# Patient Record
Sex: FEMALE | Race: WHITE | NOT HISPANIC OR LATINO | Employment: FULL TIME | URBAN - METROPOLITAN AREA
[De-identification: names, ages, dates, MRNs, and addresses within clinical notes are randomized per-mention and may not be internally consistent; named-entity substitution may affect disease eponyms.]

---

## 2022-03-09 ENCOUNTER — HISTORICAL (OUTPATIENT)
Dept: LAB | Facility: HOSPITAL | Age: 57
End: 2022-03-09

## 2022-03-09 LAB
ABS NEUT (OLG): 3.88 (ref 2.1–9.2)
BASOPHILS # BLD AUTO: 0 10*3/UL (ref 0–0.2)
BASOPHILS NFR BLD AUTO: 0 %
EOSINOPHIL # BLD AUTO: 0.1 10*3/UL (ref 0–0.9)
EOSINOPHIL NFR BLD AUTO: 1 %
ERYTHROCYTE [DISTWIDTH] IN BLOOD BY AUTOMATED COUNT: 13.8 % (ref 11.5–14.5)
FLAG2 (OHS): 70
FLAG3 (OHS): 80
FLAGS (OHS): 80
HCT VFR BLD AUTO: 39.2 % (ref 35–46)
HGB BLD-MCNC: 12.9 G/DL (ref 12–16)
IMM GRANULOCYTES # BLD AUTO: 0.02 10*3/UL
IMM GRANULOCYTES NFR BLD AUTO: 0 %
IMM. NE 2 SUSPECT FLAG (OHS): 20
LOW EVENT # SUSPECT FLAG (OHS): 80
LYMPHOCYTES # BLD AUTO: 1.3 10*3/UL (ref 0.6–4.6)
LYMPHOCYTES NFR BLD AUTO: 23 %
MANUAL DIFF? (OHS): NO
MCH RBC QN AUTO: 29.7 PG (ref 26–34)
MCHC RBC AUTO-ENTMCNC: 32.9 G/DL (ref 31–37)
MCV RBC AUTO: 90.3 FL (ref 80–100)
MO BLASTS SUSPECT FLAG (OHS): 40
MONOCYTES # BLD AUTO: 0.5 10*3/UL (ref 0.1–1.3)
MONOCYTES NFR BLD AUTO: 8 %
NEUTROPHILS # BLD AUTO: 3.88 10*3/UL (ref 2.1–9.2)
NEUTROPHILS NFR BLD AUTO: 67 %
NRBC BLD AUTO-RTO: 0 % (ref 0–0.2)
PLATELET # BLD AUTO: 149 10*3/UL (ref 130–400)
PMV BLD AUTO: 10.4 FL (ref 7.4–10.4)
RBC # BLD AUTO: 4.34 10*6/UL (ref 4–5.2)
WBC # SPEC AUTO: 5.8 10*3/UL (ref 4.5–11)

## 2022-05-14 NOTE — PROGRESS NOTES
Past medical history: NIDDM.  Morbid obesity.  Tobacco abuse.    Procedure/surgical history:  .  Tubal ligation .  Left tympanic membranes perforation and tonsillectomy.  Cholecystectomy .  Carpal tunnel surgery .    Social history:  .  Lives in [place], [place].  Works as a .  Has 3 children living.  Her son  from mesothelioma at age 35 years.; he also suffered from nasopharyngeal cancer at age 11 years.  Has been smoking half a pack of cigarettes daily for 23 years.  No alcohol or illicit drug abuse.    Family history: Son diagnosed with cancer at age 35 years.  He experience nasopharyngeal cancer at age 12 years; was treated with chemotherapy and radiation therapy in [place].   from mesothelioma at age 36 years.  He was in  for 12 years.  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    Health maintenance:  Mammogram in  or  in up to cyst, apparently unremarkable.  Has never had screening colonoscopy done.      History of present illness:  57-year-old lady referred by Jeremy Saldivar NP, with thrombocytopenia.    Labs reviewed:    2022:  -CMP: Normal.    2022:  -CBC: Hemoglobin 12.5.  MCV 94.  Platelets 142K.    2022:   -CBC: WBC 5.8.  Hemoglobin 12.9.  MCV 90.3.  Platelets 149K.  Differential count normal.    -2022:  CBC completely normal; platelets 139 K    2022:  Presents for initial hematology consultation.  Pleasant lady.  In no acute discomfort.  -denies history of bleeding or undue bruising in any form.  No history of epistaxis, bleeding from the gums, easy bruising, prolonged bleeding, hematuria, hematemesis, hemoptysis, melena, or hematochezia.  Hot flashes, headaches, swelling in the feet, numbness and tingling in feet.  No unintentional weight loss.  No recurrent fevers or chills.  No lumps or  lymphadenopathy.      Interval history:      Review of systems:  All systems reviewed, and found to be negative except for the symptoms detailed above.      Physical examination:  VITAL SIGNS:  Reviewed.      GENERAL:  In no apparent distress.    HEAD:  No signs of head trauma.  EYES:  Pupils are equal.  Extraocular motions intact.    EARS:  Hearing grossly intact.  MOUTH:  Oropharynx is normal.   NECK:  No adenopathy, no JVD.     CHEST:  Chest with clear breath sounds bilaterally.  No wheezes, rales, or rhonchi.    CARDIAC:  Regular rate and rhythm.  S1 and S2, without murmurs, gallops, or rubs.  VASCULAR:  No Edema.  Peripheral pulses normal and equal in all extremities.  ABDOMEN:  Soft, without detectable tenderness.  No sign of distention.  No   rebound or guarding, and no masses palpated.   Bowel Sounds normal.  MUSCULOSKELETAL:  Good range of motion of all major joints. Extremities without clubbing, cyanosis or edema.    NEUROLOGIC EXAM:  Alert and oriented x 3.  No focal sensory or strength deficits.   Speech normal.  Follows commands.  PSYCHIATRIC:  Mood normal.  SKIN:  No rash or lesions.  05/18/2022:  In no acute discomfort.  Very pleasant.  Morbidly obese.      Assessment:  # thrombocytopenia:  -referred for evaluation of thrombocytopenia  -03/07/2022:  Platelets 142 K; rest of CBC unremarkable  -03/09/2022:  Platelets 149 K; rest of CBC unremarkable  -05/18/2022:  Platelets 139 K; rest of CBC also unremarkable    Patient does not have thrombocytopenia.      Plan:  Patient does not have thrombocytopenia.  Does not need any investigations pertaining to platelet count abnormality which was the reason for referral.    Educated in the bottoms of a few days screening mammography as well as screening colonoscopy.  She will follow-up with her PCP to discuss this.    Does not need hematology follow-up.    Above discussed with her.  Questions answered.  Shared results of relevant investigations.  She understands  and agrees this plan, and was profusely appreciative.

## 2022-05-17 PROBLEM — D69.6 THROMBOCYTOPENIA: Status: ACTIVE | Noted: 2022-05-17

## 2022-05-18 ENCOUNTER — DOCUMENTATION ONLY (OUTPATIENT)
Dept: HEMATOLOGY/ONCOLOGY | Facility: CLINIC | Age: 57
End: 2022-05-18

## 2022-05-18 ENCOUNTER — OFFICE VISIT (OUTPATIENT)
Dept: HEMATOLOGY/ONCOLOGY | Facility: CLINIC | Age: 57
End: 2022-05-18

## 2022-05-18 ENCOUNTER — TELEPHONE (OUTPATIENT)
Dept: HEMATOLOGY/ONCOLOGY | Facility: CLINIC | Age: 57
End: 2022-05-18

## 2022-05-18 VITALS
TEMPERATURE: 98 F | WEIGHT: 279 LBS | BODY MASS INDEX: 41.32 KG/M2 | RESPIRATION RATE: 20 BRPM | SYSTOLIC BLOOD PRESSURE: 121 MMHG | DIASTOLIC BLOOD PRESSURE: 73 MMHG | HEIGHT: 69 IN | OXYGEN SATURATION: 99 % | HEART RATE: 91 BPM

## 2022-05-18 DIAGNOSIS — D69.6 THROMBOCYTOPENIA: Primary | ICD-10-CM

## 2022-05-18 DIAGNOSIS — D69.6 THROMBOCYTOPENIA: ICD-10-CM

## 2022-05-18 DIAGNOSIS — Z72.0 TOBACCO USE: Primary | ICD-10-CM

## 2022-05-18 PROCEDURE — 80053 COMPREHEN METABOLIC PANEL: CPT | Performed by: INTERNAL MEDICINE

## 2022-05-18 PROCEDURE — 85025 COMPLETE CBC W/AUTO DIFF WBC: CPT | Performed by: INTERNAL MEDICINE

## 2022-05-18 PROCEDURE — 99214 OFFICE O/P EST MOD 30 MIN: CPT | Mod: PBBFAC | Performed by: INTERNAL MEDICINE

## 2022-05-18 PROCEDURE — 99203 PR OFFICE/OUTPT VISIT, NEW, LEVL III, 30-44 MIN: ICD-10-PCS | Mod: S$PBB,,, | Performed by: INTERNAL MEDICINE

## 2022-05-18 PROCEDURE — 99203 OFFICE O/P NEW LOW 30 MIN: CPT | Mod: S$PBB,,, | Performed by: INTERNAL MEDICINE

## 2022-05-18 RX ORDER — DICLOFENAC SODIUM 50 MG/1
TABLET, DELAYED RELEASE ORAL
COMMUNITY
End: 2023-02-28

## 2022-05-18 RX ORDER — METFORMIN HYDROCHLORIDE 500 MG/1
TABLET ORAL
COMMUNITY
Start: 2022-03-14

## 2022-05-18 RX ORDER — CYCLOBENZAPRINE HCL 5 MG
5 TABLET ORAL
COMMUNITY
Start: 2022-04-26

## 2022-05-18 RX ORDER — TIZANIDINE 4 MG/1
TABLET ORAL
COMMUNITY

## 2022-05-18 NOTE — PROGRESS NOTES
Addendum:  Error      Patient no showed 2022      Past medical history: NIDDM.  Morbid obesity.    Procedure/surgical history:  .  Tubal ligation .  Left tympanic membranes perforation and tonsillectomy.  Cholecystectomy .  Carpal tunnel surgery .    Social history:    Family history: Son diagnosed with cancer at age 35 years.    Health maintenance:    Menstrual and OB/GYN history:    History of present illness:  57-year-old lady referred by Jeremy Saldivar NP, with thrombocytopenia.    2022:  -CMP: Normal.    2022:  -CBC: Hemoglobin 12.5.  MCV 94.  Platelets 142K.    2022:   -CBC: WBC 5.8.  Hemoglobin 12.9.  MCV 90.3.  Platelets 149K.  Differential count normal.      Interval history:      Review of systems:  All systems reviewed, and found to be negative except for the symptoms detailed above.      Physical examination:  VITAL SIGNS:  Reviewed.      GENERAL:  In no apparent distress.    HEAD:  No signs of head trauma.  EYES:  Pupils are equal.  Extraocular motions intact.    EARS:  Hearing grossly intact.  MOUTH:  Oropharynx is normal.   NECK:  No adenopathy, no JVD.     CHEST:  Chest with clear breath sounds bilaterally.  No wheezes, rales, or rhonchi.    CARDIAC:  Regular rate and rhythm.  S1 and S2, without murmurs, gallops, or rubs.  VASCULAR:  No Edema.  Peripheral pulses normal and equal in all extremities.  ABDOMEN:  Soft, without detectable tenderness.  No sign of distention.  No   rebound or guarding, and no masses palpated.   Bowel Sounds normal.  MUSCULOSKELETAL:  Good range of motion of all major joints. Extremities without clubbing, cyanosis or edema.    NEUROLOGIC EXAM:  Alert and oriented x 3.  No focal sensory or strength deficits.   Speech normal.  Follows commands.  PSYCHIATRIC:  Mood normal.  SKIN:  No rash or lesions.      Assessment:        Plan:

## 2022-06-23 DIAGNOSIS — Z12.31 BREAST CANCER SCREENING BY MAMMOGRAM: Primary | ICD-10-CM

## 2022-08-31 ENCOUNTER — HOSPITAL ENCOUNTER (OUTPATIENT)
Dept: RADIOLOGY | Facility: HOSPITAL | Age: 57
Discharge: HOME | End: 2022-08-31
Attending: NURSE PRACTITIONER

## 2022-08-31 DIAGNOSIS — Z12.31 BREAST CANCER SCREENING BY MAMMOGRAM: ICD-10-CM

## 2022-08-31 PROCEDURE — 77063 BREAST TOMOSYNTHESIS BI: CPT | Mod: 26,,, | Performed by: RADIOLOGY

## 2022-08-31 PROCEDURE — 77067 MAMMO DIGITAL SCREENING BILAT WITH TOMO: ICD-10-PCS | Mod: 26,,, | Performed by: RADIOLOGY

## 2022-08-31 PROCEDURE — 77063 MAMMO DIGITAL SCREENING BILAT WITH TOMO: ICD-10-PCS | Mod: 26,,, | Performed by: RADIOLOGY

## 2022-08-31 PROCEDURE — 77067 SCR MAMMO BI INCL CAD: CPT | Mod: 26,,, | Performed by: RADIOLOGY

## 2022-08-31 PROCEDURE — 77067 SCR MAMMO BI INCL CAD: CPT | Mod: TC

## 2023-02-28 ENCOUNTER — HOSPITAL ENCOUNTER (EMERGENCY)
Facility: HOSPITAL | Age: 58
Discharge: HOME | End: 2023-02-28
Attending: FAMILY MEDICINE

## 2023-02-28 VITALS
OXYGEN SATURATION: 99 % | HEART RATE: 89 BPM | WEIGHT: 286.63 LBS | TEMPERATURE: 98 F | SYSTOLIC BLOOD PRESSURE: 164 MMHG | DIASTOLIC BLOOD PRESSURE: 86 MMHG | BODY MASS INDEX: 42.45 KG/M2 | RESPIRATION RATE: 20 BRPM

## 2023-02-28 DIAGNOSIS — M25.562 ACUTE PAIN OF LEFT KNEE: Primary | ICD-10-CM

## 2023-02-28 PROCEDURE — 99284 EMERGENCY DEPT VISIT MOD MDM: CPT

## 2023-02-28 PROCEDURE — 63600175 PHARM REV CODE 636 W HCPCS: Performed by: NURSE PRACTITIONER

## 2023-02-28 PROCEDURE — 96372 THER/PROPH/DIAG INJ SC/IM: CPT | Performed by: NURSE PRACTITIONER

## 2023-02-28 RX ORDER — KETOROLAC TROMETHAMINE 30 MG/ML
30 INJECTION, SOLUTION INTRAMUSCULAR; INTRAVENOUS
Status: COMPLETED | OUTPATIENT
Start: 2023-02-28 | End: 2023-02-28

## 2023-02-28 RX ORDER — MELOXICAM 7.5 MG/1
7.5 TABLET ORAL DAILY PRN
Qty: 20 TABLET | Refills: 0 | Status: SHIPPED | OUTPATIENT
Start: 2023-02-28

## 2023-02-28 RX ADMIN — KETOROLAC TROMETHAMINE 30 MG: 30 INJECTION, SOLUTION INTRAMUSCULAR; INTRAVENOUS at 08:02

## 2023-02-28 NOTE — ED PROVIDER NOTES
Encounter Date: 2/28/2023       History     Chief Complaint   Patient presents with    Knee Pain     C/o left knee pain and swelling worsening today     The patient presents with left knee pain. The onset was 2 weeks ago.  The course/duration of symptoms is constant. Type of injury: none and none known. Location: left knee. The character of symptoms is pain and swelling.  The degree at present is moderate. There are exacerbating factors including movement, weight bearing and walking.  The relieving factor is rest. Risk factors consist of none. Prior episodes: occasional. Therapy today: none. Associated symptoms: none.     Review of patient's allergies indicates:   Allergen Reactions    Codeine      cramps    Cortisone      Other reaction(s): throat swelling     History reviewed. No pertinent past medical history.  History reviewed. No pertinent surgical history.  History reviewed. No pertinent family history.  Social History     Tobacco Use    Smoking status: Every Day     Packs/day: 1.00     Types: Cigarettes    Smokeless tobacco: Never   Substance Use Topics    Alcohol use: Never    Drug use: Never     Review of Systems   Constitutional:  Negative for fever.   HENT:  Negative for sore throat.    Respiratory:  Negative for shortness of breath.    Cardiovascular:  Negative for chest pain.   Gastrointestinal:  Negative for nausea.   Genitourinary:  Negative for dysuria.   Musculoskeletal:  Positive for arthralgias. Negative for back pain.   Skin:  Negative for rash.   Neurological:  Negative for weakness.   Hematological:  Does not bruise/bleed easily.   All other systems reviewed and are negative.    Physical Exam     Initial Vitals [02/28/23 0726]   BP Pulse Resp Temp SpO2   (!) 166/91 88 20 97.7 °F (36.5 °C) 99 %      MAP       --         Physical Exam    Nursing note and vitals reviewed.  Constitutional: She appears well-developed and well-nourished.   HENT:   Head: Normocephalic and atraumatic.   Neck: Neck  supple.   Normal range of motion.  Cardiovascular:  Normal rate, regular rhythm, normal heart sounds and intact distal pulses.           Pulmonary/Chest: Breath sounds normal.   Abdominal: Abdomen is soft. Bowel sounds are normal.   Musculoskeletal:         General: Normal range of motion.      Cervical back: Normal range of motion and neck supple.      Comments: Mild ttp and swelling left knee, FROM, good distal pulses, NVI     Neurological: She is alert. She has normal strength.   Skin: Skin is warm and dry.   Psychiatric: She has a normal mood and affect.       ED Course   Procedures  Labs Reviewed - No data to display       Imaging Results              X-Ray Knee 1 or 2 View Left (Final result)  Result time 02/28/23 09:05:32      Final result by Ulises Malone MD (02/28/23 09:05:32)                   Impression:      Degenerative changes.      Electronically signed by: Ulises Malone  Date:    02/28/2023  Time:    09:05               Narrative:    EXAMINATION:  XR KNEE 1 OR 2 VIEW LEFT    CLINICAL HISTORY:  left knee pain;    COMPARISON:  None    FINDINGS:  Two views of the left knee demonstrate no fracture or dislocation.  Degenerative changes with osteophytosis and mild joint space narrowing in the medial compartment.  Question a small suprapatellar joint effusion.                                       Medications   ketorolac injection 30 mg (30 mg Intramuscular Given 2/28/23 0834)     Medical Decision Making:   History:   Old Records Summarized: records from clinic visits and records from previous admission(s).  Clinical Tests:   Radiological Study: Ordered and Reviewed  9:42 AM DISPOSITION: The patient is resting comfortably in no acute distress.  She is hemodynamically stable and is without objective evidence for acute process requiring urgent intervention or hospitalization. I provided counseling to patient with regard to condition, the treatment plan, specific conditions for return, and the importance of  follow up. Detailed written and verbal instructions provided to patient and she expressed a verbal understanding of the discharge instructions and overall management plan. Reiterated the importance of medication administration and safety and advised patient to follow up with primary care provider in 3-5 days or sooner if needed.  Answered questions at this time. The patient is stable for discharge.                           Clinical Impression:   Final diagnoses:  [M25.562] Acute pain of left knee (Primary)        ED Disposition Condition    Discharge Stable          ED Prescriptions       Medication Sig Dispense Start Date End Date Auth. Provider    meloxicam (MOBIC) 7.5 MG tablet Take 1 tablet (7.5 mg total) by mouth daily as needed for Pain. 20 tablet 2/28/2023 -- AV Mathew          Follow-up Information       Follow up With Specialties Details Why Contact Info    Jeremy Saldivar NP Family Medicine In 3 days  [address] [place] [address]  [phone]      [facility] Emergency Medicine  If symptoms worsen [address] [facility] [address] [phone]             AV Mathew  02/28/23 0967

## 2023-02-28 NOTE — Clinical Note
"Yi"Juan Alberto Helm was seen and treated in our emergency department on 2/28/2023.  She may return to work on 03/02/2023.       If you have any questions or concerns, please don't hesitate to call.      CHRISTINA MathewP"

## 2023-09-13 ENCOUNTER — HOSPITAL ENCOUNTER (EMERGENCY)
Facility: HOSPITAL | Age: 58
Discharge: HOME | End: 2023-09-13
Attending: EMERGENCY MEDICINE

## 2023-09-13 VITALS
RESPIRATION RATE: 18 BRPM | BODY MASS INDEX: 46.36 KG/M2 | OXYGEN SATURATION: 99 % | DIASTOLIC BLOOD PRESSURE: 90 MMHG | SYSTOLIC BLOOD PRESSURE: 174 MMHG | TEMPERATURE: 99 F | HEART RATE: 95 BPM | WEIGHT: 278.25 LBS | HEIGHT: 65 IN

## 2023-09-13 DIAGNOSIS — M25.561 KNEE PAIN, RIGHT: ICD-10-CM

## 2023-09-13 PROCEDURE — 63600175 PHARM REV CODE 636 W HCPCS: Performed by: EMERGENCY MEDICINE

## 2023-09-13 PROCEDURE — 99284 EMERGENCY DEPT VISIT MOD MDM: CPT

## 2023-09-13 PROCEDURE — 96372 THER/PROPH/DIAG INJ SC/IM: CPT | Performed by: EMERGENCY MEDICINE

## 2023-09-13 RX ORDER — KETOROLAC TROMETHAMINE 30 MG/ML
60 INJECTION, SOLUTION INTRAMUSCULAR; INTRAVENOUS
Status: COMPLETED | OUTPATIENT
Start: 2023-09-13 | End: 2023-09-13

## 2023-09-13 RX ORDER — DICLOFENAC SODIUM 75 MG/1
75 TABLET, DELAYED RELEASE ORAL 2 TIMES DAILY
Qty: 15 TABLET | Refills: 0 | Status: SHIPPED | OUTPATIENT
Start: 2023-09-13 | End: 2023-09-21

## 2023-09-13 RX ADMIN — KETOROLAC TROMETHAMINE 60 MG: 30 INJECTION, SOLUTION INTRAMUSCULAR at 07:09

## 2023-09-13 NOTE — ED PROVIDER NOTES
Encounter Date: 9/13/2023       History     Chief Complaint   Patient presents with    Knee Pain     Right knee pain      58-year-old woman presents this morning after a mild twisting event, subsequently right-sided knee pain.  She is denying overt trauma.  She denies distal neurovascular symptoms.  She acknowledges similar event affecting the contralateral knee, records demonstrate history negative x-ray of the left knee.  The principal request this morning are for x-ray and anti-inflammatory and work note.  Patient appears well, we do plan to comply with requests this morning essentially in order to tighten connections between patient and ongoing care she is likely to need in the longer term given age 58, probable degenerative osteoarthritis and likely will ultimately require additional medical services in the intermediate range future.      Leg Pain   The incident occurred at home. Injury mechanism: twisting. The incident occurred several weeks ago. The pain is present in the right knee. The quality of the pain is described as aching. The pain is at a severity of 2/10. The pain has been Constant since onset. Pertinent negatives include no numbness, no inability to bear weight, no loss of motion and no muscle weakness. She reports no foreign bodies present. Nothing aggravates the symptoms. She has tried nothing for the symptoms. The treatment provided mild relief.     Review of patient's allergies indicates:   Allergen Reactions    Codeine      cramps    Cortisone      Other reaction(s): throat swelling     No past medical history on file.  No past surgical history on file.  No family history on file.  Social History     Tobacco Use    Smoking status: Every Day     Current packs/day: 1.00     Types: Cigarettes    Smokeless tobacco: Never   Substance Use Topics    Alcohol use: Never    Drug use: Never     Review of Systems   Neurological:  Negative for numbness.   All other systems reviewed and are  negative.      Physical Exam     Initial Vitals [09/13/23 0725]   BP Pulse Resp Temp SpO2   (!) 174/90 95 18 98.5 °F (36.9 °C) 99 %      MAP       --         Physical Exam    Nursing note and vitals reviewed.  Constitutional: She appears well-developed and well-nourished. She is not diaphoretic. No distress.   HENT:   Head: Normocephalic and atraumatic.   Right Ear: External ear normal.   Left Ear: External ear normal.   Eyes: EOM are normal. Pupils are equal, round, and reactive to light. Right eye exhibits no discharge. Left eye exhibits no discharge.   Neck: Neck supple. No thyromegaly present. No tracheal deviation present. No JVD present.   Normal range of motion.  Cardiovascular:  Normal rate, regular rhythm, normal heart sounds and intact distal pulses.     Exam reveals no gallop and no friction rub.       No murmur heard.  Pulmonary/Chest: Breath sounds normal. No stridor. No respiratory distress. She has no wheezes. She has no rhonchi. She has no rales.   Abdominal: Abdomen is soft. Bowel sounds are normal. She exhibits no distension. There is no abdominal tenderness. There is no rebound and no guarding.   Musculoskeletal:         General: No tenderness or edema. Normal range of motion.      Cervical back: Normal range of motion and neck supple.     Neurological: She is alert and oriented to person, place, and time. She has normal strength. No cranial nerve deficit or sensory deficit. GCS score is 15. GCS eye subscore is 4. GCS verbal subscore is 5. GCS motor subscore is 6.   Skin: Skin is warm and dry. Capillary refill takes less than 2 seconds. No rash and no abscess noted. No erythema. No pallor.   Psychiatric: She has a normal mood and affect. Her behavior is normal. Judgment and thought content normal.         ED Course   Procedures  Labs Reviewed - No data to display       Imaging Results              X-Ray Knee 3 View Right (Final result)  Result time 09/13/23 08:02:33      Final result by Nahun  Jose AVILEZ MD (09/13/23 08:02:33)                   Impression:      Moderate degenerative changes with no acute fracture.      Electronically signed by: Jose Garnica  Date:    09/13/2023  Time:    08:02               Narrative:    EXAMINATION:  XR KNEE 3 VIEW RIGHT    CLINICAL HISTORY:  Pain in right knee    TECHNIQUE:  AP, lateral, and Merchant views of the right knee were performed.    COMPARISON:  Left knee 02/28/2023    FINDINGS:  Degenerative changes with tricompartmental osteophytes.  No effusion.  No displaced fracture.  Loss of joint space greatest in the medial compartment.                                    X-Rays:   Independently Interpreted Readings:   Other Readings:  Negative knee ;    Medications   ketorolac injection 60 mg (60 mg Intramuscular Given 9/13/23 0741)     Medical Decision Making  Knee strain    Amount and/or Complexity of Data Reviewed  Radiology: ordered and independent interpretation performed. Decision-making details documented in ED Course.    Risk  Prescription drug management.                               Clinical Impression:   Final diagnoses:  [M25.561] Knee pain, right        ED Disposition Condition    Discharge Stable          ED Prescriptions       Medication Sig Dispense Start Date End Date Auth. Provider    diclofenac (VOLTAREN) 75 MG EC tablet Take 1 tablet (75 mg total) by mouth 2 (two) times daily. for 15 doses 15 tablet 9/13/2023 9/21/2023 Davion Lanza MD          Follow-up Information       Follow up With Specialties Details Why Contact Info [facility] Emergency Medicine  As needed, If symptoms worsen [address] [facility] [address] [phone]             Davion Lanza MD  09/13/23 0888

## 2025-01-20 ENCOUNTER — HOSPITAL ENCOUNTER (EMERGENCY)
Facility: HOSPITAL | Age: 60
Discharge: HOME | End: 2025-01-20
Attending: INTERNAL MEDICINE

## 2025-01-20 VITALS
DIASTOLIC BLOOD PRESSURE: 72 MMHG | WEIGHT: 280 LBS | OXYGEN SATURATION: 98 % | BODY MASS INDEX: 46.65 KG/M2 | RESPIRATION RATE: 20 BRPM | HEART RATE: 103 BPM | SYSTOLIC BLOOD PRESSURE: 102 MMHG | TEMPERATURE: 98 F | HEIGHT: 65 IN

## 2025-01-20 DIAGNOSIS — R06.00 DYSPNEA, UNSPECIFIED TYPE: Primary | ICD-10-CM

## 2025-01-20 DIAGNOSIS — J45.909 REACTIVE AIRWAY DISEASE WITHOUT COMPLICATION, UNSPECIFIED ASTHMA SEVERITY, UNSPECIFIED WHETHER PERSISTENT: ICD-10-CM

## 2025-01-20 DIAGNOSIS — R07.9 CHEST PAIN: ICD-10-CM

## 2025-01-20 LAB
ALBUMIN SERPL-MCNC: 4.6 G/DL (ref 3.4–5)
ALBUMIN/GLOB SERPL: 1.4 RATIO
ALP SERPL-CCNC: 123 UNIT/L (ref 50–144)
ALT SERPL-CCNC: 59 UNIT/L (ref 1–45)
ANION GAP SERPL CALC-SCNC: 4 MEQ/L (ref 2–13)
AST SERPL-CCNC: 34 UNIT/L (ref 14–36)
BASOPHILS # BLD AUTO: 0.01 X10(3)/MCL (ref 0.01–0.08)
BASOPHILS NFR BLD AUTO: 0.1 % (ref 0.1–1.2)
BILIRUB SERPL-MCNC: 0.5 MG/DL (ref 0–1)
BNP BLD-MCNC: 75.7 PG/ML (ref 0–124.9)
BUN SERPL-MCNC: 9 MG/DL (ref 7–20)
CALCIUM SERPL-MCNC: 9.2 MG/DL (ref 8.4–10.2)
CHLORIDE SERPL-SCNC: 106 MMOL/L (ref 98–110)
CO2 SERPL-SCNC: 27 MMOL/L (ref 21–32)
CREAT SERPL-MCNC: 0.65 MG/DL (ref 0.66–1.25)
CREAT/UREA NIT SERPL: 14 (ref 12–20)
EOSINOPHIL # BLD AUTO: 0 X10(3)/MCL (ref 0.04–0.36)
EOSINOPHIL NFR BLD AUTO: 0 % (ref 0.7–7)
ERYTHROCYTE [DISTWIDTH] IN BLOOD BY AUTOMATED COUNT: 13 % (ref 11–14.5)
GFR SERPLBLD CREATININE-BSD FMLA CKD-EPI: >90 ML/MIN/1.73/M2
GLOBULIN SER-MCNC: 3.4 GM/DL (ref 2–3.9)
GLUCOSE SERPL-MCNC: 150 MG/DL (ref 70–115)
HCT VFR BLD AUTO: 39.7 % (ref 36–48)
HGB BLD-MCNC: 13.1 G/DL (ref 11.8–16)
IMM GRANULOCYTES # BLD AUTO: 0.02 X10(3)/MCL (ref 0–0.03)
IMM GRANULOCYTES NFR BLD AUTO: 0.2 % (ref 0–0.5)
INR PPP: 1
LYMPHOCYTES # BLD AUTO: 1.51 X10(3)/MCL (ref 1.16–3.74)
LYMPHOCYTES NFR BLD AUTO: 15.6 % (ref 20–55)
MAGNESIUM SERPL-MCNC: 2 MG/DL (ref 1.8–2.4)
MCH RBC QN AUTO: 28.6 PG (ref 27–34)
MCHC RBC AUTO-ENTMCNC: 33 G/DL (ref 31–37)
MCV RBC AUTO: 86.7 FL (ref 79–99)
MONOCYTES # BLD AUTO: 0.59 X10(3)/MCL (ref 0.24–0.36)
MONOCYTES NFR BLD AUTO: 6.1 % (ref 4.7–12.5)
NEUTROPHILS # BLD AUTO: 7.57 X10(3)/MCL (ref 1.56–6.13)
NEUTROPHILS NFR BLD AUTO: 78 % (ref 37–73)
OHS QRS DURATION: 86 MS
OHS QTC CALCULATION: 460 MS
PLATELET # BLD AUTO: 111 X10(3)/MCL (ref 140–371)
PMV BLD AUTO: 10.4 FL (ref 9.4–12.4)
POTASSIUM SERPL-SCNC: 4 MMOL/L (ref 3.5–5.1)
PROT SERPL-MCNC: 8 GM/DL (ref 6.3–8.2)
PROTHROMBIN TIME: 9.9 SECONDS (ref 9.3–11.9)
RBC # BLD AUTO: 4.58 X10(6)/MCL (ref 4–5.1)
SODIUM SERPL-SCNC: 137 MMOL/L (ref 136–145)
TROPONIN I SERPL-MCNC: <0.012 NG/ML (ref 0–0.03)
WBC # BLD AUTO: 9.7 X10(3)/MCL (ref 4–11.5)

## 2025-01-20 PROCEDURE — 85025 COMPLETE CBC W/AUTO DIFF WBC: CPT | Performed by: INTERNAL MEDICINE

## 2025-01-20 PROCEDURE — 80053 COMPREHEN METABOLIC PANEL: CPT | Performed by: INTERNAL MEDICINE

## 2025-01-20 PROCEDURE — 94640 AIRWAY INHALATION TREATMENT: CPT

## 2025-01-20 PROCEDURE — 85610 PROTHROMBIN TIME: CPT | Performed by: INTERNAL MEDICINE

## 2025-01-20 PROCEDURE — 93005 ELECTROCARDIOGRAM TRACING: CPT

## 2025-01-20 PROCEDURE — 93010 ELECTROCARDIOGRAM REPORT: CPT | Mod: ,,, | Performed by: INTERNAL MEDICINE

## 2025-01-20 PROCEDURE — 96372 THER/PROPH/DIAG INJ SC/IM: CPT | Performed by: INTERNAL MEDICINE

## 2025-01-20 PROCEDURE — 99284 EMERGENCY DEPT VISIT MOD MDM: CPT | Mod: 25

## 2025-01-20 PROCEDURE — 63600175 PHARM REV CODE 636 W HCPCS: Mod: JZ,TB | Performed by: INTERNAL MEDICINE

## 2025-01-20 PROCEDURE — 94761 N-INVAS EAR/PLS OXIMETRY MLT: CPT

## 2025-01-20 PROCEDURE — 84484 ASSAY OF TROPONIN QUANT: CPT | Performed by: INTERNAL MEDICINE

## 2025-01-20 PROCEDURE — 25000242 PHARM REV CODE 250 ALT 637 W/ HCPCS: Performed by: INTERNAL MEDICINE

## 2025-01-20 PROCEDURE — 83880 ASSAY OF NATRIURETIC PEPTIDE: CPT | Performed by: INTERNAL MEDICINE

## 2025-01-20 PROCEDURE — 83735 ASSAY OF MAGNESIUM: CPT | Performed by: INTERNAL MEDICINE

## 2025-01-20 RX ORDER — ALBUTEROL SULFATE 90 UG/1
2 INHALANT RESPIRATORY (INHALATION) EVERY 6 HOURS PRN
Qty: 18 G | Refills: 0 | Status: SHIPPED | OUTPATIENT
Start: 2025-01-20 | End: 2026-01-20

## 2025-01-20 RX ORDER — METHYLPREDNISOLONE SOD SUCC 125 MG
125 VIAL (EA) INJECTION
Status: COMPLETED | OUTPATIENT
Start: 2025-01-20 | End: 2025-01-20

## 2025-01-20 RX ORDER — IPRATROPIUM BROMIDE AND ALBUTEROL SULFATE 2.5; .5 MG/3ML; MG/3ML
3 SOLUTION RESPIRATORY (INHALATION)
Status: COMPLETED | OUTPATIENT
Start: 2025-01-20 | End: 2025-01-20

## 2025-01-20 RX ORDER — BUDESONIDE 0.5 MG/2ML
0.5 INHALANT ORAL ONCE
Status: COMPLETED | OUTPATIENT
Start: 2025-01-20 | End: 2025-01-20

## 2025-01-20 RX ORDER — METHYLPREDNISOLONE SOD SUCC 125 MG
125 VIAL (EA) INJECTION
Status: DISCONTINUED | OUTPATIENT
Start: 2025-01-20 | End: 2025-01-20

## 2025-01-20 RX ADMIN — METHYLPREDNISOLONE SODIUM SUCCINATE 125 MG: 125 INJECTION, POWDER, FOR SOLUTION INTRAMUSCULAR; INTRAVENOUS at 01:01

## 2025-01-20 RX ADMIN — IPRATROPIUM BROMIDE AND ALBUTEROL SULFATE 3 ML: .5; 3 SOLUTION RESPIRATORY (INHALATION) at 12:01

## 2025-01-20 RX ADMIN — BUDESONIDE INHALATION 0.5 MG: 0.5 SUSPENSION RESPIRATORY (INHALATION) at 12:01

## 2025-01-20 NOTE — ED PROVIDER NOTES
Encounter Date: 2025       History     Chief Complaint   Patient presents with    Shortness of Breath    Cough     SOB X 2HRS. CURRENTLY ON ABX AND STEROIDS FOR AN URI DX LAST WEEK     This is a 59-year-old female patient came into the emergency room with history of having shortness of breath that started at 10:00 p.m. tonight.  Patient denies having any prior history of COPD, CHF, asthma.  Denies having any fever.  Patient recently has been diagnosed with bronchitis in his been given antibiotics.  Currently denies any chest pain or palpitations.  Patient does have history of diabetes and smoking.  No history of CAD, coronary stents.  Denies nausea vomiting or abdominal pain.        Review of patient's allergies indicates:   Allergen Reactions    Codeine      cramps    Cortisone      Other reaction(s): throat swelling     Past Medical History:   Diagnosis Date    Type 2 diabetes mellitus without complications      Past Surgical History:   Procedure Laterality Date     SECTION      CHOLECYSTECTOMY       No family history on file.  Social History     Tobacco Use    Smoking status: Every Day     Current packs/day: 1.00     Types: Cigarettes    Smokeless tobacco: Never   Substance Use Topics    Alcohol use: Never    Drug use: Never     Review of Systems   Constitutional: Negative.  Negative for fatigue.   HENT:  Negative for congestion, ear discharge and ear pain.    Eyes: Negative.  Negative for discharge and itching.   Respiratory:  Positive for cough and shortness of breath.    Cardiovascular:  Negative for chest pain, palpitations and leg swelling.   Gastrointestinal:  Negative for constipation, nausea and rectal pain.   Endocrine: Negative.    Genitourinary:  Negative for difficulty urinating, dyspareunia, dysuria and flank pain.   Musculoskeletal: Negative.  Negative for back pain.   Skin: Negative.  Negative for rash.   Neurological: Negative.  Negative for seizures, light-headedness, numbness and  headaches.   Hematological: Negative.    Psychiatric/Behavioral:  Negative for agitation.    All other systems reviewed and are negative.      Physical Exam     Initial Vitals [01/20/25 0003]   BP Pulse Resp Temp SpO2   (!) 159/95 (!) 113 (!) 24 98 °F (36.7 °C) 95 %      MAP       --         Physical Exam    Nursing note and vitals reviewed.  Constitutional: She appears well-developed and well-nourished.   HENT:   Head: Normocephalic and atraumatic.   Eyes: EOM are normal. Pupils are equal, round, and reactive to light.   Neck: Neck supple.   Normal range of motion.  Cardiovascular:  Normal rate, regular rhythm, normal heart sounds and intact distal pulses.           Pulmonary/Chest:   Diminished breath sounds noted.   Abdominal: Abdomen is soft. Bowel sounds are normal.   Musculoskeletal:         General: Normal range of motion.      Cervical back: Normal range of motion and neck supple.     Neurological: She is alert and oriented to person, place, and time. She has normal strength. GCS score is 15. GCS eye subscore is 4. GCS verbal subscore is 5. GCS motor subscore is 6.   Skin: Skin is warm and dry. Capillary refill takes less than 2 seconds.         ED Course   Procedures  Labs Reviewed   COMPREHENSIVE METABOLIC PANEL - Abnormal       Result Value    Sodium 137      Potassium 4.0      Chloride 106      CO2 27      Glucose 150 (*)     Blood Urea Nitrogen 9      Creatinine 0.65 (*)     Calcium 9.2      Protein Total 8.0      Albumin 4.6      Globulin 3.4      Albumin/Globulin Ratio 1.4      Bilirubin Total 0.5            ALT 59 (*)     AST 34      eGFR >90      Anion Gap 4.0      BUN/Creatinine Ratio 14     CBC WITH DIFFERENTIAL - Abnormal    WBC 9.70      RBC 4.58      Hgb 13.1      Hct 39.7      MCV 86.7      MCH 28.6      MCHC 33.0      RDW 13.0      Platelet 111 (*)     MPV 10.4      Neut % 78.0 (*)     Lymph % 15.6 (*)     Mono % 6.1      Eos % 0.0 (*)     Basophil % 0.1      Lymph # 1.51      Neut #  7.57 (*)     Mono # 0.59 (*)     Eos # 0.00 (*)     Baso # 0.01      Imm Gran # 0.02      Imm Grans % 0.2     TROPONIN I - Normal    Troponin-I <0.012     NT-PRO NATRIURETIC PEPTIDE - Normal    ProBNP 75.7     MAGNESIUM - Normal    Magnesium Level 2.00     PROTIME-INR - Normal    PT 9.9      INR 1.0      Narrative:     Protimes are used to monitor anticoagulant agents such as warfarin. PT INR values are based on the current patient normal mean and the PARVIZ value for the specific instrument reagent used.  **Routine theraputic target values for the INR are 2.0-3.0**   CBC W/ AUTO DIFFERENTIAL    Narrative:     The following orders were created for panel order CBC auto differential.  Procedure                               Abnormality         Status                     ---------                               -----------         ------                     CBC with Differential[[id number]]        Abnormal            Final result                 Please view results for these tests on the individual orders.     EKG Readings: (Independently Interpreted)   EKG shows sinus tachycardia with heart rate of 108, CO interval 144 QRS duration 86  milliseconds.  No ST depressions or ST elevations.       Imaging Results    None          Medications   albuterol-ipratropium 2.5 mg-0.5 mg/3 mL nebulizer solution 3 mL (3 mLs Nebulization Given 1/20/25 0058)   budesonide nebulizer solution 0.5 mg (0.5 mg Nebulization Given 1/20/25 0058)   methylPREDNISolone sodium succinate injection 125 mg (125 mg Intramuscular Given 1/20/25 0106)     Medical Decision Making  This is a 59-year-old female patient came into the emergency room with history of having shortness of breath that started at 10:00 p.m. tonight.  Patient denies having any prior history of COPD, CHF, asthma.  Denies having any fever.  Patient recently has been diagnosed with bronchitis in his been given antibiotics.  Currently denies any chest pain or palpitations.  Patient  does have history of diabetes and smoking.  No history of CAD, coronary stents.  Denies nausea vomiting or abdominal pain.    Patient's troponin and BNP are normal.  No acute STT wave changes in the EKG.  Patient verbalized symptomatic relief after breathing treatments and Solu-Medrol.    Advised to take medications as prescribed and follow up with primary doctor.    Differential diagnosis: 1. Bronchitis 2.  Reactive airway disease    Amount and/or Complexity of Data Reviewed  Labs: ordered.    Risk  Prescription drug management.                                      Clinical Impression:  Final diagnoses:  [R07.9] Chest pain  [R06.00] Dyspnea, unspecified type (Primary)  [J45.909] Reactive airway disease without complication, unspecified asthma severity, unspecified whether persistent          ED Disposition Condition    Discharge Stable          ED Prescriptions       Medication Sig Dispense Start Date End Date Auth. Provider    albuterol (PROAIR HFA) 90 mcg/actuation inhaler Inhale 2 puffs into the lungs every 6 (six) hours as needed for Wheezing. Rescue 18 g 1/20/2025 1/20/2026 Nura Chou DO          Follow-up Information       Follow up With Specialties Details Why Contact Info    Follow up with pulmonologist                 Nura Chou DO  01/20/25 0147       Nura Chou DO  01/20/25 0147

## 2025-01-20 NOTE — Clinical Note
"Yi"Juan Alberto Helm was seen and treated in our emergency department on 1/19/2025.  She may return to work on 01/21/2025.       If you have any questions or concerns, please don't hesitate to call.      DR COLON/NAYE MONTEZ RN RN    "